# Patient Record
Sex: MALE | Race: WHITE
[De-identification: names, ages, dates, MRNs, and addresses within clinical notes are randomized per-mention and may not be internally consistent; named-entity substitution may affect disease eponyms.]

---

## 2018-09-01 ENCOUNTER — HOSPITAL ENCOUNTER (EMERGENCY)
Dept: HOSPITAL 89 - ER | Age: 38
Discharge: HOME | End: 2018-09-01
Payer: COMMERCIAL

## 2018-09-01 VITALS — DIASTOLIC BLOOD PRESSURE: 92 MMHG | SYSTOLIC BLOOD PRESSURE: 124 MMHG

## 2018-09-01 DIAGNOSIS — J02.0: Primary | ICD-10-CM

## 2018-09-01 PROCEDURE — 99282 EMERGENCY DEPT VISIT SF MDM: CPT

## 2018-09-01 NOTE — ER REPORT
History and Physical


Time Seen By MD:  17:58


Hx. of Stated Complaint:  


PATIENT REPORTS SOME SWELLING UNDER HIS JAW THAT STARTED ON TUESDAY. HE HAS NO 


DIFFICULTY EATING OR DRINKING


HPI/ROS


CHIEF COMPLAINT: Sore throat, enlarged lymph nodes





HISTORY OF PRESENT ILLNESS: 37-year-old male patient persists to emergency room 


with complaint of sore throat enlarged lymph nodes. Patient states this been 


going on since Tuesday. He states that he has taken some NyQuil and ibuprofen 


for this. He states that has seemed to help with swelling to the lymph nodes. 


Patient states that he has felt feverish, he states he is not checked a 


temperature but did wrap up in a blanket on the couch. He denies any cough, 


sinus congestion, vomiting or diarrhea. Patient states that he has been getting 


better and so he came in for evaluation tonight.


Allergies:  


Coded Allergies:  


     shellfish derived (Verified  Allergy, Unknown, NAUSEA/VOMITING, 9/1/14)


Uncoded Allergies:  


     WALNUTS (Allergy, Mild, ITCHY TONGUE, 2/22/09)


Home Meds


Active Scripts


 Amoxicillin 500 Mg Tab (AMOXICILLIN 500 MG TAB) 500 Mg Tablet, 1 TAB PO BID, 


#20 TAB


   Prov:TERRANCE SALAZAR FNP         9/1/18


Reported Medications


Glucosamine Sulfate 2KCL (GLUCOSAMINE) 1,000 Mg Tablet, 1000 MG PO


   9/1/18


Discontinued Reported Medications


[none]   No Conflict Check


   11/8/13


Past Medical/Surgical History


Patient has a past medical history of alcohol use.


Patient denies any surgical history.


Hx Smoking:  Yes (1/2ppd)


Smoking Status:  Current: Every Day Smoker


Hx Substance Use Disorder:  No


Hx Alcohol Use:  Yes (OCC)


Constitutional





Vital Sign - Last 24 Hours








 9/1/18





 17:54


 


Temp 99.1


 


Pulse 82


 


Resp 20


 


B/P (MAP) 124/92


 


Pulse Ox 90


 


O2 Delivery Room Air








Physical Exam


   General appearance: Alert no distress.


Respiratory: Chest is non tender, lungs are clear to auscultation.


Cardiac: Regular rate and rhythm.


ENT: Tympanic membranes are pearly-gray, auditory canals are patent, mucus mucou


s membranes are moist. Patient does have bilateral enlarged tonsils with 


tonsillar exudate, he also has cervical lymphadenopathy.





DIFFERENTIAL DIAGNOSIS: After history and physical exam differential diagnosis 


was considered for pharyngitis, strep, mono.





Medical Decision Making


ED Course/Re-evaluation


ED Course


Patient was admitted to an exam room, history of physical were obtained. 


Differential diagnoses were considered. On examination patient does have 


bilateral cervical chain lymphadenopathy, bilateral tonsillar enlargement with 


tonsillar exudate. With history of a fever, tonsillar exudate, enlarged cervical


 lymph nodes, negative cough I believe the likelihood of his having strep is 


significant. I discussed with patient that we could do a strep screen, which 


would entail a culture, but however I do not feel that would change my treatment


 modality at this time. Patient stated he would go ahead and defer strep screen 


at this time. We will go ahead and treat him with amoxicillin and he is to 


follow-up with his primary care provider in the next week. Patient verbalized 


understanding and agreement with plan.


Decision to Disposition Date:  Sep 1, 2018


Decision to Disposition Time:  18:08





Depart


Departure


Latest Vital Signs





Vital Signs








  Date Time  Temp Pulse Resp B/P (MAP) Pulse Ox O2 Delivery O2 Flow Rate FiO2


 


9/1/18 17:54 99.1 82 20 124/92 90 Room Air  








Impression:  


   Primary Impression:  


   Pharyngitis


Condition:  Improved


Disposition:  HOME OR SELF-CARE


Referrals:  


LEE ANDRADE MD (PCP)


New Scripts


 Amoxicillin 500 Mg Tab (AMOXICILLIN 500 MG TAB) 500 Mg Tablet


1 TAB PO BID, #20 TAB


   Prov: TERRANCE SALAZAR         9/1/18


Patient Instructions:  Pharyngitis (ED)





Additional Instructions:  


Increase fluid intake.


The colder the liquid the more it can soothe your throat.


Follow up with your primary care provider in the next week.


Return to the ER if condition worsens.





Problem Qualifiers








   Primary Impression:  


   Pharyngitis


   Pharyngitis/tonsillitis etiology:  streptococcus  Qualified Codes:  J02.0 - 


   Streptococcal pharyngitis








TERRANCE SALAZAR                 Sep 1, 2018 17:58